# Patient Record
Sex: MALE | Race: WHITE | NOT HISPANIC OR LATINO
[De-identification: names, ages, dates, MRNs, and addresses within clinical notes are randomized per-mention and may not be internally consistent; named-entity substitution may affect disease eponyms.]

---

## 2017-01-03 ENCOUNTER — RX RENEWAL (OUTPATIENT)
Age: 32
End: 2017-01-03

## 2017-04-05 ENCOUNTER — RX RENEWAL (OUTPATIENT)
Age: 32
End: 2017-04-05

## 2017-04-19 ENCOUNTER — APPOINTMENT (OUTPATIENT)
Dept: INTERNAL MEDICINE | Facility: CLINIC | Age: 32
End: 2017-04-19

## 2017-04-19 VITALS
HEIGHT: 70 IN | BODY MASS INDEX: 24.91 KG/M2 | HEART RATE: 70 BPM | WEIGHT: 174 LBS | SYSTOLIC BLOOD PRESSURE: 100 MMHG | DIASTOLIC BLOOD PRESSURE: 62 MMHG | OXYGEN SATURATION: 97 %

## 2017-04-21 ENCOUNTER — TRANSCRIPTION ENCOUNTER (OUTPATIENT)
Age: 32
End: 2017-04-21

## 2017-04-21 LAB
ALBUMIN SERPL ELPH-MCNC: 4.8 G/DL
ALP BLD-CCNC: 51 U/L
ALT SERPL-CCNC: 20 U/L
ANION GAP SERPL CALC-SCNC: 16 MMOL/L
AST SERPL-CCNC: 16 U/L
BILIRUB SERPL-MCNC: 0.5 MG/DL
BUN SERPL-MCNC: 14 MG/DL
C TRACH RRNA SPEC QL NAA+PROBE: NORMAL
CALCIUM SERPL-MCNC: 9.9 MG/DL
CHLORIDE SERPL-SCNC: 103 MMOL/L
CO2 SERPL-SCNC: 22 MMOL/L
CREAT SERPL-MCNC: 1 MG/DL
GLUCOSE SERPL-MCNC: 100 MG/DL
HIV1+2 AB SPEC QL IA.RAPID: NONREACTIVE
N GONORRHOEA RRNA SPEC QL NAA+PROBE: NORMAL
POTASSIUM SERPL-SCNC: 4.5 MMOL/L
PROT SERPL-MCNC: 6.9 G/DL
SODIUM SERPL-SCNC: 141 MMOL/L
SOURCE AMPLIFICATION: NORMAL
T PALLIDUM AB SER QL IA: NEGATIVE

## 2017-06-06 ENCOUNTER — LABORATORY RESULT (OUTPATIENT)
Age: 32
End: 2017-06-06

## 2017-06-07 ENCOUNTER — APPOINTMENT (OUTPATIENT)
Dept: INTERNAL MEDICINE | Facility: CLINIC | Age: 32
End: 2017-06-07
Payer: COMMERCIAL

## 2017-06-07 VITALS
HEIGHT: 70 IN | TEMPERATURE: 98.6 F | OXYGEN SATURATION: 98 % | HEART RATE: 76 BPM | SYSTOLIC BLOOD PRESSURE: 110 MMHG | DIASTOLIC BLOOD PRESSURE: 70 MMHG | BODY MASS INDEX: 24.34 KG/M2 | WEIGHT: 170 LBS

## 2017-06-07 DIAGNOSIS — R59.9 ENLARGED LYMPH NODES, UNSPECIFIED: ICD-10-CM

## 2017-06-07 DIAGNOSIS — Z87.09 PERSONAL HISTORY OF OTHER DISEASES OF THE RESPIRATORY SYSTEM: ICD-10-CM

## 2017-06-07 DIAGNOSIS — K92.1 MELENA: ICD-10-CM

## 2017-06-07 LAB — S PYO AG SPEC QL IA: NEGATIVE

## 2017-06-07 PROCEDURE — 36415 COLL VENOUS BLD VENIPUNCTURE: CPT

## 2017-06-07 PROCEDURE — 87880 STREP A ASSAY W/OPTIC: CPT | Mod: QW

## 2017-06-07 PROCEDURE — 99214 OFFICE O/P EST MOD 30 MIN: CPT | Mod: 25

## 2017-06-09 ENCOUNTER — TRANSCRIPTION ENCOUNTER (OUTPATIENT)
Age: 32
End: 2017-06-09

## 2017-06-09 ENCOUNTER — RX RENEWAL (OUTPATIENT)
Age: 32
End: 2017-06-09

## 2017-06-09 LAB
ALBUMIN SERPL ELPH-MCNC: 4.8 G/DL
ALP BLD-CCNC: 56 U/L
ALT SERPL-CCNC: 22 U/L
ANION GAP SERPL CALC-SCNC: 21 MMOL/L
AST SERPL-CCNC: 19 U/L
BASOPHILS # BLD AUTO: 0.02 K/UL
BASOPHILS NFR BLD AUTO: 0.2 %
BILIRUB SERPL-MCNC: 0.3 MG/DL
BUN SERPL-MCNC: 12 MG/DL
C TRACH RRNA SPEC QL NAA+PROBE: ABNORMAL
C TRACH RRNA SPEC QL NAA+PROBE: NORMAL
C TRACH RRNA SPEC QL NAA+PROBE: NORMAL
CALCIUM SERPL-MCNC: 9.9 MG/DL
CHLORIDE SERPL-SCNC: 102 MMOL/L
CO2 SERPL-SCNC: 21 MMOL/L
CREAT SERPL-MCNC: 0.96 MG/DL
CRP SERPL-MCNC: <0.2 MG/DL
EOSINOPHIL # BLD AUTO: 0.19 K/UL
EOSINOPHIL NFR BLD AUTO: 2.3 %
ERYTHROCYTE [SEDIMENTATION RATE] IN BLOOD BY WESTERGREN METHOD: 2 MM/HR
GLUCOSE SERPL-MCNC: 94 MG/DL
HCT VFR BLD CALC: 46 %
HGB BLD-MCNC: 14.8 G/DL
HIV1+2 AB SPEC QL IA.RAPID: NONREACTIVE
IMM GRANULOCYTES NFR BLD AUTO: 0.4 %
LYMPHOCYTES # BLD AUTO: 2.21 K/UL
LYMPHOCYTES NFR BLD AUTO: 26.2 %
MAN DIFF?: NORMAL
MCHC RBC-ENTMCNC: 30.5 PG
MCHC RBC-ENTMCNC: 32.2 GM/DL
MCV RBC AUTO: 94.8 FL
MONOCYTES # BLD AUTO: 0.69 K/UL
MONOCYTES NFR BLD AUTO: 8.2 %
N GONORRHOEA RRNA SPEC QL NAA+PROBE: NORMAL
NEUTROPHILS # BLD AUTO: 5.29 K/UL
NEUTROPHILS NFR BLD AUTO: 62.7 %
PLATELET # BLD AUTO: 259 K/UL
POTASSIUM SERPL-SCNC: 4.4 MMOL/L
PROT SERPL-MCNC: 7.2 G/DL
RBC # BLD: 4.85 M/UL
RBC # FLD: 13.1 %
SODIUM SERPL-SCNC: 144 MMOL/L
SOURCE AMPLIFICATION: NORMAL
T PALLIDUM AB SER QL IA: NEGATIVE
WBC # FLD AUTO: 8.43 K/UL

## 2017-06-12 ENCOUNTER — TRANSCRIPTION ENCOUNTER (OUTPATIENT)
Age: 32
End: 2017-06-12

## 2017-06-12 LAB
G LAMBLIA AG STL QL: NORMAL
WRIGHT STN STL: NORMAL

## 2017-06-13 ENCOUNTER — TRANSCRIPTION ENCOUNTER (OUTPATIENT)
Age: 32
End: 2017-06-13

## 2017-06-13 LAB — DEPRECATED O AND P PREP STL: ABNORMAL

## 2017-06-15 ENCOUNTER — RESULT REVIEW (OUTPATIENT)
Age: 32
End: 2017-06-15

## 2017-06-15 ENCOUNTER — TRANSCRIPTION ENCOUNTER (OUTPATIENT)
Age: 32
End: 2017-06-15

## 2017-06-15 LAB — BACTERIA STL CULT: NORMAL

## 2017-06-26 ENCOUNTER — TRANSCRIPTION ENCOUNTER (OUTPATIENT)
Age: 32
End: 2017-06-26

## 2017-06-28 ENCOUNTER — TRANSCRIPTION ENCOUNTER (OUTPATIENT)
Age: 32
End: 2017-06-28

## 2017-06-28 ENCOUNTER — RX RENEWAL (OUTPATIENT)
Age: 32
End: 2017-06-28

## 2017-07-20 ENCOUNTER — TRANSCRIPTION ENCOUNTER (OUTPATIENT)
Age: 32
End: 2017-07-20

## 2017-08-15 ENCOUNTER — TRANSCRIPTION ENCOUNTER (OUTPATIENT)
Age: 32
End: 2017-08-15

## 2017-10-10 ENCOUNTER — APPOINTMENT (OUTPATIENT)
Dept: INTERNAL MEDICINE | Facility: CLINIC | Age: 32
End: 2017-10-10
Payer: COMMERCIAL

## 2017-10-10 VITALS
OXYGEN SATURATION: 97 % | WEIGHT: 177.13 LBS | DIASTOLIC BLOOD PRESSURE: 76 MMHG | HEART RATE: 72 BPM | TEMPERATURE: 98 F | HEIGHT: 70 IN | SYSTOLIC BLOOD PRESSURE: 110 MMHG | BODY MASS INDEX: 25.36 KG/M2

## 2017-10-10 DIAGNOSIS — M54.5 LOW BACK PAIN: ICD-10-CM

## 2017-10-10 DIAGNOSIS — Z72.52 HIGH RISK HOMOSEXUAL BEHAVIOR: ICD-10-CM

## 2017-10-10 DIAGNOSIS — F41.8 OTHER SPECIFIED ANXIETY DISORDERS: ICD-10-CM

## 2017-10-10 PROCEDURE — 90686 IIV4 VACC NO PRSV 0.5 ML IM: CPT

## 2017-10-10 PROCEDURE — G0008: CPT

## 2017-10-10 PROCEDURE — 36415 COLL VENOUS BLD VENIPUNCTURE: CPT

## 2017-10-10 PROCEDURE — 99214 OFFICE O/P EST MOD 30 MIN: CPT | Mod: 25

## 2017-10-10 RX ORDER — AZITHROMYCIN 500 MG/1
500 TABLET, FILM COATED ORAL
Qty: 2 | Refills: 0 | Status: DISCONTINUED | COMMUNITY
Start: 2017-06-09 | End: 2017-10-10

## 2017-10-10 RX ORDER — EMTRICITABINE AND TENOFOVIR DISOPROXIL FUMARATE 200; 300 MG/1; MG/1
200-300 TABLET, FILM COATED ORAL
Qty: 90 | Refills: 0 | Status: ACTIVE | COMMUNITY
Start: 2017-06-15 | End: 1900-01-01

## 2017-10-11 LAB
ALBUMIN SERPL ELPH-MCNC: 4.5 G/DL
ALP BLD-CCNC: 54 U/L
ALT SERPL-CCNC: 22 U/L
ANION GAP SERPL CALC-SCNC: 16 MMOL/L
AST SERPL-CCNC: 28 U/L
BILIRUB SERPL-MCNC: 0.2 MG/DL
BUN SERPL-MCNC: 14 MG/DL
CALCIUM SERPL-MCNC: 9.5 MG/DL
CHLORIDE SERPL-SCNC: 105 MMOL/L
CO2 SERPL-SCNC: 21 MMOL/L
CREAT SERPL-MCNC: 1.01 MG/DL
GLUCOSE SERPL-MCNC: 72 MG/DL
POTASSIUM SERPL-SCNC: 4.2 MMOL/L
PROT SERPL-MCNC: 6.9 G/DL
SODIUM SERPL-SCNC: 142 MMOL/L
T PALLIDUM AB SER QL IA: NEGATIVE

## 2017-10-12 ENCOUNTER — RX RENEWAL (OUTPATIENT)
Age: 32
End: 2017-10-12

## 2017-10-12 ENCOUNTER — TRANSCRIPTION ENCOUNTER (OUTPATIENT)
Age: 32
End: 2017-10-12

## 2017-10-12 LAB
C TRACH RRNA SPEC QL NAA+PROBE: DETECTED
C TRACH RRNA SPEC QL NAA+PROBE: NOT DETECTED
C TRACH RRNA SPEC QL NAA+PROBE: NOT DETECTED
HIV1+2 AB SPEC QL IA.RAPID: NONREACTIVE
N GONORRHOEA RRNA SPEC QL NAA+PROBE: NOT DETECTED
SOURCE AMPLIFICATION: NORMAL

## 2017-10-12 RX ORDER — AZITHROMYCIN 500 MG/1
500 TABLET, FILM COATED ORAL
Qty: 2 | Refills: 0 | Status: ACTIVE | COMMUNITY
Start: 2017-10-12 | End: 1900-01-01

## 2017-10-24 ENCOUNTER — TRANSCRIPTION ENCOUNTER (OUTPATIENT)
Age: 32
End: 2017-10-24

## 2017-11-01 ENCOUNTER — LABORATORY RESULT (OUTPATIENT)
Age: 32
End: 2017-11-01

## 2017-11-01 ENCOUNTER — APPOINTMENT (OUTPATIENT)
Dept: INTERNAL MEDICINE | Facility: CLINIC | Age: 32
End: 2017-11-01
Payer: COMMERCIAL

## 2017-11-01 VITALS
HEART RATE: 111 BPM | WEIGHT: 177 LBS | TEMPERATURE: 98.2 F | BODY MASS INDEX: 25.34 KG/M2 | DIASTOLIC BLOOD PRESSURE: 79 MMHG | SYSTOLIC BLOOD PRESSURE: 119 MMHG | OXYGEN SATURATION: 96 % | HEIGHT: 70 IN

## 2017-11-01 DIAGNOSIS — A74.9 CHLAMYDIAL INFECTION, UNSPECIFIED: ICD-10-CM

## 2017-11-01 PROCEDURE — 99214 OFFICE O/P EST MOD 30 MIN: CPT

## 2017-11-03 ENCOUNTER — TRANSCRIPTION ENCOUNTER (OUTPATIENT)
Age: 32
End: 2017-11-03

## 2017-11-03 LAB
C TRACH RRNA SPEC QL NAA+PROBE: NOT DETECTED
N GONORRHOEA RRNA SPEC QL NAA+PROBE: NOT DETECTED
SOURCE AMPLIFICATION: NORMAL

## 2017-11-27 ENCOUNTER — TRANSCRIPTION ENCOUNTER (OUTPATIENT)
Age: 32
End: 2017-11-27

## 2017-11-27 DIAGNOSIS — G44.209 TENSION-TYPE HEADACHE, UNSPECIFIED, NOT INTRACTABLE: ICD-10-CM

## 2017-11-28 ENCOUNTER — TRANSCRIPTION ENCOUNTER (OUTPATIENT)
Age: 32
End: 2017-11-28

## 2017-11-28 PROBLEM — G44.209 TENSION HEADACHE: Status: ACTIVE | Noted: 2017-11-28

## 2017-11-28 RX ORDER — CYCLOBENZAPRINE HYDROCHLORIDE 5 MG/1
5 TABLET, FILM COATED ORAL 3 TIMES DAILY
Qty: 45 | Refills: 1 | Status: ACTIVE | COMMUNITY
Start: 2017-11-28 | End: 1900-01-01

## 2017-12-18 ENCOUNTER — APPOINTMENT (OUTPATIENT)
Dept: INTERNAL MEDICINE | Facility: CLINIC | Age: 32
End: 2017-12-18

## 2017-12-25 ENCOUNTER — MOBILE ON CALL (OUTPATIENT)
Age: 32
End: 2017-12-25

## 2017-12-27 ENCOUNTER — APPOINTMENT (OUTPATIENT)
Dept: INTERNAL MEDICINE | Facility: CLINIC | Age: 32
End: 2017-12-27

## 2019-05-03 ENCOUNTER — TRANSCRIPTION ENCOUNTER (OUTPATIENT)
Age: 34
End: 2019-05-03

## 2020-12-15 PROBLEM — Z87.09 HISTORY OF SORE THROAT: Status: RESOLVED | Noted: 2017-06-07 | Resolved: 2020-12-15

## 2023-03-21 ENCOUNTER — APPOINTMENT (OUTPATIENT)
Dept: URBAN - METROPOLITAN AREA CLINIC 207 | Age: 38
Setting detail: DERMATOLOGY
End: 2023-03-21

## 2023-03-21 DIAGNOSIS — D485 NEOPLASM OF UNCERTAIN BEHAVIOR OF SKIN: ICD-10-CM

## 2023-03-21 PROBLEM — D48.5 NEOPLASM OF UNCERTAIN BEHAVIOR OF SKIN: Status: ACTIVE | Noted: 2023-03-21

## 2023-03-21 PROCEDURE — OTHER ADDITIONAL NOTES: OTHER

## 2023-03-21 PROCEDURE — 11306 SHAVE SKIN LESION 0.6-1.0 CM: CPT

## 2023-03-21 PROCEDURE — OTHER MIPS QUALITY: OTHER

## 2023-03-21 PROCEDURE — OTHER SHAVE REMOVAL: OTHER

## 2023-03-21 ASSESSMENT — LOCATION SIMPLE DESCRIPTION DERM: LOCATION SIMPLE: POSTERIOR SCALP

## 2023-03-21 ASSESSMENT — LOCATION ZONE DERM: LOCATION ZONE: SCALP

## 2023-03-21 ASSESSMENT — LOCATION DETAILED DESCRIPTION DERM: LOCATION DETAILED: LEFT SUPERIOR OCCIPITAL SCALP

## 2023-03-21 NOTE — PROCEDURE: SHAVE REMOVAL
Post-Care Instructions: I reviewed with the patient in detail post-care instructions. Patient is to keep the bandage dry overnight, then wash with soap and water, and replace with Vaseline and a bandage daily until completely healed.

## 2023-03-23 ENCOUNTER — APPOINTMENT (OUTPATIENT)
Dept: URBAN - METROPOLITAN AREA CLINIC 207 | Age: 38
Setting detail: DERMATOLOGY
End: 2023-03-27

## 2023-03-23 DIAGNOSIS — L82.1 OTHER SEBORRHEIC KERATOSIS: ICD-10-CM

## 2023-03-23 DIAGNOSIS — D18.0 HEMANGIOMA: ICD-10-CM

## 2023-03-23 DIAGNOSIS — D22 MELANOCYTIC NEVI: ICD-10-CM

## 2023-03-23 DIAGNOSIS — L57.8 OTHER SKIN CHANGES DUE TO CHRONIC EXPOSURE TO NONIONIZING RADIATION: ICD-10-CM

## 2023-03-23 DIAGNOSIS — B36.0 PITYRIASIS VERSICOLOR: ICD-10-CM

## 2023-03-23 PROBLEM — D22.72 MELANOCYTIC NEVI OF LEFT LOWER LIMB, INCLUDING HIP: Status: ACTIVE | Noted: 2023-03-23

## 2023-03-23 PROBLEM — D18.01 HEMANGIOMA OF SKIN AND SUBCUTANEOUS TISSUE: Status: ACTIVE | Noted: 2023-03-23

## 2023-03-23 PROBLEM — D22.9 MELANOCYTIC NEVI, UNSPECIFIED: Status: ACTIVE | Noted: 2023-03-23

## 2023-03-23 PROBLEM — D22.71 MELANOCYTIC NEVI OF RIGHT LOWER LIMB, INCLUDING HIP: Status: ACTIVE | Noted: 2023-03-23

## 2023-03-23 PROCEDURE — OTHER OBSERVATION: OTHER

## 2023-03-23 PROCEDURE — OTHER MIPS QUALITY: OTHER

## 2023-03-23 PROCEDURE — 99214 OFFICE O/P EST MOD 30 MIN: CPT

## 2023-03-23 PROCEDURE — OTHER PRESCRIPTION: OTHER

## 2023-03-23 PROCEDURE — OTHER COUNSELING: OTHER

## 2023-03-23 RX ORDER — KETOCONAZOLE 20 MG/ML
SHAMPOO, SUSPENSION TOPICAL WEEKLY
Qty: 120 | Refills: 6 | Status: ERX | COMMUNITY
Start: 2023-03-23

## 2023-03-23 ASSESSMENT — LOCATION ZONE DERM
LOCATION ZONE: TRUNK
LOCATION ZONE: FEET

## 2023-03-23 ASSESSMENT — LOCATION SIMPLE DESCRIPTION DERM
LOCATION SIMPLE: RIGHT FOOT
LOCATION SIMPLE: RIGHT LOWER BACK
LOCATION SIMPLE: GROIN
LOCATION SIMPLE: CHEST
LOCATION SIMPLE: LEFT FOOT
LOCATION SIMPLE: ABDOMEN

## 2023-03-23 ASSESSMENT — BSA RASH: BSA RASH: 8

## 2023-03-23 ASSESSMENT — LOCATION DETAILED DESCRIPTION DERM
LOCATION DETAILED: LEFT LATERAL DORSAL FOOT
LOCATION DETAILED: LEFT INGUINAL CREASE
LOCATION DETAILED: UPPER STERNUM
LOCATION DETAILED: RIGHT SUPERIOR LATERAL MIDBACK
LOCATION DETAILED: 4TH WEBSPACE LEFT FOOT
LOCATION DETAILED: PERIUMBILICAL SKIN
LOCATION DETAILED: RIGHT LATERAL DORSAL FOOT
LOCATION DETAILED: LEFT LATERAL SUPERIOR CHEST

## 2023-05-03 NOTE — HPI: FULL BODY SKIN EXAMINATION
How Severe Are Your Spot(S)?: mild
Well , 7 Years Old  Well-child exams are recommended visits with a health care provider to track your child's growth and development at certain ages. This sheet tells you what to expect during this visit.  Recommended immunizations  Hepatitis B vaccine. Your child may get doses of this vaccine if needed to catch up on missed doses.  Diphtheria and tetanus toxoids and acellular pertussis (DTaP) vaccine. The fifth dose of a 5-dose series should be given unless the fourth dose was given at age 4 years or older. The fifth dose should be given 6 months or later after the fourth dose.  Your child may get doses of the following vaccines if he or she has certain high-risk conditions:  Pneumococcal conjugate (PCV13) vaccine.  Pneumococcal polysaccharide (PPSV23) vaccine.  Inactivated poliovirus vaccine. The fourth dose of a 4-dose series should be given at age 4-6 years. The fourth dose should be given at least 6 months after the third dose.  Influenza vaccine (flu shot). Starting at age 6 months, your child should be given the flu shot every year. Children between the ages of 6 months and 8 years who get the flu shot for the first time should get a second dose at least 4 weeks after the first dose. After that, only a single yearly (annual) dose is recommended.  Measles, mumps, and rubella (MMR) vaccine. The second dose of a 2-dose series should be given at age 4-6 years.  Varicella vaccine. The second dose of a 2-dose series should be given at age 4-6 years.  Hepatitis A vaccine. Children who did not receive the vaccine before 2 years of age should be given the vaccine only if they are at risk for infection or if hepatitis A protection is desired.  Meningococcal conjugate vaccine. Children who have certain high-risk conditions, are present during an outbreak, or are traveling to a country with a high rate of meningitis should receive this vaccine.  Your child may receive vaccines as individual doses or as more 
What Type Of Note Output Would You Prefer (Optional)?: Standard Output
than one vaccine together in one shot (combination vaccines). Talk with your child's health care provider about the risks and benefits of combination vaccines.  Testing  Vision  Starting at age 6, have your child's vision checked every 2 years, as long as he or she does not have symptoms of vision problems. Finding and treating eye problems early is important for your child's development and readiness for school.  If an eye problem is found, your child may need to have his or her vision checked every year (instead of every 2 years). Your child may also:  Be prescribed glasses.  Have more tests done.  Need to visit an eye specialist.  Other tests    Talk with your child's health care provider about the need for certain screenings. Depending on your child's risk factors, your child's health care provider may screen for:  Low red blood cell count (anemia).  Hearing problems.  Lead poisoning.  Tuberculosis (TB).  High cholesterol.  High blood sugar (glucose).  Your child's health care provider will measure your child's BMI (body mass index) to screen for obesity.  Your child should have his or her blood pressure checked at least once a year.  General instructions  Parenting tips  Recognize your child's desire for privacy and independence. When appropriate, give your child a chance to solve problems by himself or herself. Encourage your child to ask for help when he or she needs it.  Ask your child about school and friends on a regular basis. Maintain close contact with your child's teacher at school.  Establish family rules (such as about bedtime, screen time, TV watching, chores, and safety). Give your child chores to do around the house.  Praise your child when he or she uses safe behavior, such as when he or she is careful near a street or body of water.  Set clear behavioral boundaries and limits. Discuss consequences of good and bad behavior. Praise and reward positive behaviors, improvements, and 
What Is The Reason For Today's Visit?: Full Body Skin Examination
accomplishments.  Correct or discipline your child in private. Be consistent and fair with discipline.  Do not hit your child or allow your child to hit others.  Talk with your health care provider if you think your child is hyperactive, has an abnormally short attention span, or is very forgetful.  Sexual curiosity is common. Answer questions about sexuality in clear and correct terms.  Oral health    Your child may start to lose baby teeth and get his or her first back teeth (molars).  Continue to monitor your child's toothbrushing and encourage regular flossing. Make sure your child is brushing twice a day (in the morning and before bed) and using fluoride toothpaste.  Schedule regular dental visits for your child. Ask your child's dentist if your child needs sealants on his or her permanent teeth.  Give fluoride supplements as told by your child's health care provider.  Sleep  Children at this age need 9-12 hours of sleep a day. Make sure your child gets enough sleep.  Continue to stick to bedtime routines. Reading every night before bedtime may help your child relax.  Try not to let your child watch TV before bedtime.  If your child frequently has problems sleeping, discuss these problems with your child's health care provider.  Elimination  Nighttime bed-wetting may still be normal, especially for boys or if there is a family history of bed-wetting.  It is best not to punish your child for bed-wetting.  If your child is wetting the bed during both daytime and nighttime, contact your health care provider.  What's next?  Your next visit will occur when your child is 8 years old.  Summary  Starting at age 6, have your child's vision checked every 2 years. If an eye problem is found, your child should get treated early, and his or her vision checked every year.  Your child may start to lose baby teeth and get his or her first back teeth (molars). Monitor your child's toothbrushing and encourage regular 
What Is The Reason For Today's Visit? (Being Monitored For X): concerning skin lesions on an annual basis
flossing.  Continue to keep bedtime routines. Try not to let your child watch TV before bedtime. Instead encourage your child to do something relaxing before bed, such as reading.  When appropriate, give your child an opportunity to solve problems by himself or herself. Encourage your child to ask for help when needed.  This information is not intended to replace advice given to you by your health care provider. Make sure you discuss any questions you have with your health care provider.  Document Released: 01/07/2008 Document Revised: 04/07/2020 Document Reviewed: 09/13/2019  Elsevier Patient Education © 2020 [employer]

## 2024-03-27 ENCOUNTER — APPOINTMENT (OUTPATIENT)
Dept: URBAN - METROPOLITAN AREA CLINIC 207 | Age: 39
Setting detail: DERMATOLOGY
End: 2024-03-27

## 2024-03-27 DIAGNOSIS — L82.1 OTHER SEBORRHEIC KERATOSIS: ICD-10-CM

## 2024-03-27 DIAGNOSIS — D22 MELANOCYTIC NEVI: ICD-10-CM

## 2024-03-27 DIAGNOSIS — D18.0 HEMANGIOMA: ICD-10-CM

## 2024-03-27 DIAGNOSIS — L57.8 OTHER SKIN CHANGES DUE TO CHRONIC EXPOSURE TO NONIONIZING RADIATION: ICD-10-CM

## 2024-03-27 PROBLEM — D18.01 HEMANGIOMA OF SKIN AND SUBCUTANEOUS TISSUE: Status: ACTIVE | Noted: 2024-03-27

## 2024-03-27 PROBLEM — D22.9 MELANOCYTIC NEVI, UNSPECIFIED: Status: ACTIVE | Noted: 2024-03-27

## 2024-03-27 PROCEDURE — OTHER MIPS QUALITY: OTHER

## 2024-03-27 PROCEDURE — OTHER COUNSELING: OTHER

## 2024-03-27 PROCEDURE — 99213 OFFICE O/P EST LOW 20 MIN: CPT

## 2024-03-27 ASSESSMENT — LOCATION SIMPLE DESCRIPTION DERM
LOCATION SIMPLE: CHEST
LOCATION SIMPLE: RIGHT LOWER BACK

## 2024-03-27 ASSESSMENT — LOCATION DETAILED DESCRIPTION DERM
LOCATION DETAILED: UPPER STERNUM
LOCATION DETAILED: RIGHT SUPERIOR LATERAL MIDBACK
LOCATION DETAILED: LEFT LATERAL SUPERIOR CHEST

## 2024-03-27 ASSESSMENT — LOCATION ZONE DERM: LOCATION ZONE: TRUNK

## 2024-03-27 NOTE — HPI: FULL BODY SKIN EXAMINATION
How Severe Are Your Spot(S)?: moderate
What Type Of Note Output Would You Prefer (Optional)?: Standard Output
What Is The Reason For Today's Visit?: Full Body Skin Examination
What Is The Reason For Today's Visit? (Being Monitored For X): concerning skin lesions on an annual basis
Additional History: Patient is only specifically concerned with lesion that became irritated under left axillary.

## 2024-06-13 ENCOUNTER — DASHBOARD ENCOUNTERS (OUTPATIENT)
Age: 39
End: 2024-06-13

## 2024-06-13 ENCOUNTER — OFFICE VISIT (OUTPATIENT)
Dept: URBAN - METROPOLITAN AREA CLINIC 105 | Facility: CLINIC | Age: 39
End: 2024-06-13
Payer: COMMERCIAL

## 2024-06-13 VITALS
DIASTOLIC BLOOD PRESSURE: 73 MMHG | SYSTOLIC BLOOD PRESSURE: 114 MMHG | HEIGHT: 70 IN | HEART RATE: 73 BPM | WEIGHT: 202 LBS | TEMPERATURE: 97.7 F | BODY MASS INDEX: 28.92 KG/M2

## 2024-06-13 DIAGNOSIS — K21.9 GASTROESOPHAGEAL REFLUX DISEASE, UNSPECIFIED WHETHER ESOPHAGITIS PRESENT: ICD-10-CM

## 2024-06-13 DIAGNOSIS — R19.5 LOOSE STOOLS: ICD-10-CM

## 2024-06-13 DIAGNOSIS — R10.32 LLQ PAIN: ICD-10-CM

## 2024-06-13 DIAGNOSIS — K62.5 RECTAL BLEEDING: ICD-10-CM

## 2024-06-13 PROBLEM — 235595009: Status: ACTIVE | Noted: 2024-06-13

## 2024-06-13 PROCEDURE — 99244 OFF/OP CNSLTJ NEW/EST MOD 40: CPT | Performed by: INTERNAL MEDICINE

## 2024-06-13 PROCEDURE — 99204 OFFICE O/P NEW MOD 45 MIN: CPT | Performed by: INTERNAL MEDICINE

## 2024-06-13 RX ORDER — CABOTEGRAVIR 600 MG/3ML
KIT INTRAMUSCULAR
Qty: 3 UNSPECIFIED | Status: ACTIVE | COMMUNITY

## 2024-06-13 RX ORDER — BUSPIRONE HYDROCHLORIDE 15 MG/1
TABLET ORAL
Qty: 180 TABLET | Status: ACTIVE | COMMUNITY

## 2024-06-13 RX ORDER — OMEPRAZOLE 20 MG/1
1 CAPSULE 30 MINUTES BEFORE MORNING MEAL CAPSULE, DELAYED RELEASE ORAL ONCE A DAY
Status: ACTIVE | COMMUNITY

## 2024-06-13 RX ORDER — TESTOSTERONE CYPIONATE 200 MG/ML
INJECTION INTRAMUSCULAR
Qty: 4 | Status: ACTIVE | COMMUNITY

## 2024-06-13 NOTE — HPI-ZZZTODAY'S VISIT
39-year-old male with PMH of anxiety and sleep apnea, referred by Cheryl Ballesteros NP for evaluation of GERD.  A copy of this note will be sent to referring provider. Patient was ordered colonoscopy and ultrasound for abdominal pain by Robert Mccarty NP in 5/2024.  Noted that patient's MGF passed of colon cancer. Thinks MGM had pancreatic cancer. PGM had bad acid reflux.  Pt states for about 1-2 years he has had sharp pain in his L side of the abdomen associated with bad acid reflux. Both symptoms go away when he is on omeprazole 20 mg daily. He did this for 1 month, then recurred after he stopped, then did another 6 months. He has been taking omeprazole again for about 1 week. States he was tested for H pylori about 1 year ago and was negative. L-sided pain is sharp and located right above his hip bone. Lasts for 1 minute at a time. Happens about once a day. Not seeming to be triggered by eating.  He notices that he has BMs more often and looser off omeprazole; states they are not liquidy but less formed. Feels like things are moving through faster and not being digested as well. When he is on omeprazole he has 3 BMs/daily. He has seen bright red blood if having a lot of BMs or wiping a lot. This would be on toilet paper or in the toilet bowl, lasting for about 1 week at a time. He feels that the rectal bleeding is external, not coming from internal. Someone told him to drink more tonic water, another person told him to avoid nuts and grains - but these did not help with symptoms. He thought it might be ulcers. He used to take a lot of ibuprofen, but has not in the last 8 months.  He had [facility] done at [facility] ordered by Robert Mccarty NP; per pt this was normal. He also had blood work done about 2 months ago - showed me CBC and CMP results on his phone and essentially normal.

## 2024-06-13 NOTE — PHYSICAL EXAM GASTROINTESTINAL
Abdomen soft, nontender, nondistended, no guarding or rigidity, no masses palpable, normal bowel sounds Liver and Spleen no hepatosplenomegaly Rectal deferred

## 2024-06-14 LAB
IMMUNOGLOBULIN A, QN, SERUM: 199
T-TRANSGLUTAMINASE (TTG) IGA: <1
TSH W/REFLEX TO FT4: 3.23

## 2024-06-19 ENCOUNTER — LAB OUTSIDE AN ENCOUNTER (OUTPATIENT)
Dept: URBAN - METROPOLITAN AREA CLINIC 105 | Facility: CLINIC | Age: 39
End: 2024-06-19

## 2024-06-23 LAB
ADENOVIRUS F 40/41: NOT DETECTED
CALPROTECTIN, STOOL - QDX: (no result)
CAMPYLOBACTER: NOT DETECTED
CLOSTRIDIUM DIFFICILE: NOT DETECTED
ENTAMOEBA HISTOLYTICA: NOT DETECTED
ENTEROAGGREGATIVE E.COLI: NOT DETECTED
ENTEROTOXIGENIC E.COLI: NOT DETECTED
ESCHERICHIA COLI O157: NOT DETECTED
FECAL FAT, QUALITATIVE: (no result)
GIARDIA LAMBLIA: NOT DETECTED
NOROVIRUS GI/GII: NOT DETECTED
PANCREATICELASTASE ELISA, STOOL: (no result)
ROTAVIRUS A: NOT DETECTED
SALMONELLA SPP.: NOT DETECTED
SHIGA-LIKE TOXIN PRODUCING E.COLI: NOT DETECTED
SHIGELLA SPP. / ENTEROINVASIVE E.COLI: NOT DETECTED
VIBRIO PARAHAEMOLYTICUS: NOT DETECTED
VIBRIO SPP.: NOT DETECTED
YERSINIA ENTEROCOLITICA: NOT DETECTED

## 2024-09-03 ENCOUNTER — OFFICE VISIT (OUTPATIENT)
Dept: URBAN - METROPOLITAN AREA CLINIC 105 | Facility: CLINIC | Age: 39
End: 2024-09-03
Payer: COMMERCIAL

## 2024-09-03 VITALS
DIASTOLIC BLOOD PRESSURE: 80 MMHG | WEIGHT: 194 LBS | HEIGHT: 70 IN | BODY MASS INDEX: 27.77 KG/M2 | HEART RATE: 73 BPM | SYSTOLIC BLOOD PRESSURE: 118 MMHG | TEMPERATURE: 97.1 F

## 2024-09-03 DIAGNOSIS — Z80.0 FAMILY HISTORY OF COLON CANCER: ICD-10-CM

## 2024-09-03 DIAGNOSIS — R10.32 LLQ ABDOMINAL PAIN: ICD-10-CM

## 2024-09-03 DIAGNOSIS — E34.9 HYPOTESTOSTERONISM: ICD-10-CM

## 2024-09-03 DIAGNOSIS — K21.9 CHRONIC GERD: ICD-10-CM

## 2024-09-03 PROBLEM — 235595009: Status: ACTIVE | Noted: 2024-09-03

## 2024-09-03 PROCEDURE — 99214 OFFICE O/P EST MOD 30 MIN: CPT | Performed by: INTERNAL MEDICINE

## 2024-09-03 RX ORDER — OMEPRAZOLE 20 MG/1
1 CAPSULE 30 MINUTES BEFORE MORNING MEAL CAPSULE, DELAYED RELEASE ORAL ONCE A DAY
Status: ACTIVE | COMMUNITY

## 2024-09-03 RX ORDER — NIRMATRELVIR AND RITONAVIR 300-100 MG
KIT ORAL
Qty: 30 TABLET | Status: ACTIVE | COMMUNITY

## 2024-09-03 RX ORDER — CABOTEGRAVIR 600 MG/3ML
KIT INTRAMUSCULAR
Qty: 3 UNSPECIFIED | Status: ACTIVE | COMMUNITY

## 2024-09-03 RX ORDER — BUSPIRONE HYDROCHLORIDE 15 MG/1
TABLET ORAL
Qty: 180 TABLET | Status: ACTIVE | COMMUNITY

## 2024-09-03 RX ORDER — TESTOSTERONE CYPIONATE 200 MG/ML
INJECTION INTRAMUSCULAR
Qty: 4 | Status: ACTIVE | COMMUNITY

## 2024-09-03 NOTE — HPI-ZZZTODAY'S VISIT
39-year-old male with PMH of anxiety and sleep apnea, referred by Cheryl Ballesteros NP for evaluation of GERD.  A copy of this note will be sent to referring provider. Patient was ordered colonoscopy and ultrasound for abdominal pain by Robert Mccarty NP in 5/2024.  Noted that patient's MGF passed of colon cancer. Thinks MGM had pancreatic cancer. PGM had bad acid reflux.  Pt states for about 1-2 years he has had sharp pain in his L side of the abdomen associated with bad acid reflux. Both symptoms go away when he is on omeprazole 20 mg daily. He did this for 1 month, then recurred after he stopped, then did another 6 months. He has been taking omeprazole again for about 1 week. States he was tested for H pylori about 1 year ago and was negative. L-sided pain is sharp and located right above his hip bone. Lasts for 1 minute at a time. Happens about once a day. Not seeming to be triggered by eating.  He notices that he has BMs more often and looser off omeprazole; states they are not liquidy but less formed. Feels like things are moving through faster and not being digested as well. When he is on omeprazole he has 3 BMs/daily. He has seen bright red blood if having a lot of BMs or wiping a lot. This would be on toilet paper or in the toilet bowl, lasting for about 1 week at a time. He feels that the rectal bleeding is external, not coming from internal. Someone told him to drink more tonic water, another person told him to avoid nuts and grains - but these did not help with symptoms. He thought it might be ulcers. He used to take a lot of ibuprofen, but has not in the last 8 months.  He had US done at [facility] ordered by Robert Mccarty NP; per pt this was normal. He also had blood work done about 2 months ago - showed me CBC and CMP results on his phone and essentially normal. - 9/3/2024: pt is having a sharp pain in the LLQ. he says that if he takes pantoprazole and probiotics it helps it. if he stiops this the pain will come back.  he says that this pain has been there for aout 1 years. if he does not take the omeprazole, more frequent. if not on omeprazole, he has bout a4-5 stools a day and looser. if taking ppi he has more solid stools. rare superficial blood on the outside.

## 2024-10-18 ENCOUNTER — OFFICE VISIT (OUTPATIENT)
Dept: URBAN - METROPOLITAN AREA SURGERY CENTER 16 | Facility: SURGERY CENTER | Age: 39
End: 2024-10-18

## 2024-12-06 ENCOUNTER — OFFICE VISIT (OUTPATIENT)
Dept: URBAN - METROPOLITAN AREA SURGERY CENTER 16 | Facility: SURGERY CENTER | Age: 39
End: 2024-12-06

## 2024-12-23 ENCOUNTER — CLAIMS CREATED FROM THE CLAIM WINDOW (OUTPATIENT)
Dept: URBAN - METROPOLITAN AREA SURGERY CENTER 16 | Facility: SURGERY CENTER | Age: 39
End: 2024-12-23
Payer: COMMERCIAL

## 2024-12-23 ENCOUNTER — CLAIMS CREATED FROM THE CLAIM WINDOW (OUTPATIENT)
Dept: URBAN - METROPOLITAN AREA CLINIC 4 | Facility: CLINIC | Age: 39
End: 2024-12-23
Payer: COMMERCIAL

## 2024-12-23 DIAGNOSIS — R10.32 ABDOMINAL CRAMPING IN LEFT LOWER QUADRANT: ICD-10-CM

## 2024-12-23 DIAGNOSIS — K31.89 OTHER DISEASES OF STOMACH AND DUODENUM: ICD-10-CM

## 2024-12-23 DIAGNOSIS — K21.9 ACID REFLUX: ICD-10-CM

## 2024-12-23 DIAGNOSIS — K21.9 GERD: ICD-10-CM

## 2024-12-23 DIAGNOSIS — R10.13 ABDOMINAL DISCOMFORT, EPIGASTRIC: ICD-10-CM

## 2024-12-23 DIAGNOSIS — K30 DYSPEPSIA: ICD-10-CM

## 2024-12-23 DIAGNOSIS — Z80.0 FAMILY HISTORY OF COLON CANCER: ICD-10-CM

## 2024-12-23 DIAGNOSIS — K44.9 HIATAL HERNIA: ICD-10-CM

## 2024-12-23 PROCEDURE — 43239 EGD BIOPSY SINGLE/MULTIPLE: CPT | Performed by: INTERNAL MEDICINE

## 2024-12-23 PROCEDURE — 88312 SPECIAL STAINS GROUP 1: CPT | Performed by: PATHOLOGY

## 2024-12-23 PROCEDURE — 00813 ANES UPR LWR GI NDSC PX: CPT | Performed by: ANESTHESIOLOGIST ASSISTANT

## 2024-12-23 PROCEDURE — 00813 ANES UPR LWR GI NDSC PX: CPT | Performed by: ANESTHESIOLOGY

## 2024-12-23 PROCEDURE — 88305 TISSUE EXAM BY PATHOLOGIST: CPT | Performed by: PATHOLOGY

## 2024-12-23 PROCEDURE — 45378 DIAGNOSTIC COLONOSCOPY: CPT | Performed by: INTERNAL MEDICINE

## 2024-12-23 RX ORDER — NIRMATRELVIR AND RITONAVIR 300-100 MG
KIT ORAL
Qty: 30 TABLET | Status: ACTIVE | COMMUNITY

## 2024-12-23 RX ORDER — OMEPRAZOLE 20 MG/1
1 CAPSULE 30 MINUTES BEFORE MORNING MEAL CAPSULE, DELAYED RELEASE ORAL ONCE A DAY
Status: ACTIVE | COMMUNITY

## 2024-12-23 RX ORDER — TESTOSTERONE CYPIONATE 200 MG/ML
INJECTION INTRAMUSCULAR
Qty: 4 | Status: ACTIVE | COMMUNITY

## 2024-12-23 RX ORDER — CABOTEGRAVIR 600 MG/3ML
KIT INTRAMUSCULAR
Qty: 3 UNSPECIFIED | Status: ACTIVE | COMMUNITY

## 2024-12-23 RX ORDER — BUSPIRONE HYDROCHLORIDE 15 MG/1
TABLET ORAL
Qty: 180 TABLET | Status: ACTIVE | COMMUNITY